# Patient Record
Sex: FEMALE | Race: WHITE | Employment: OTHER | URBAN - METROPOLITAN AREA
[De-identification: names, ages, dates, MRNs, and addresses within clinical notes are randomized per-mention and may not be internally consistent; named-entity substitution may affect disease eponyms.]

---

## 2018-07-24 ENCOUNTER — APPOINTMENT (OUTPATIENT)
Dept: GENERAL RADIOLOGY | Facility: HOSPITAL | Age: 83
End: 2018-07-24
Attending: EMERGENCY MEDICINE
Payer: MEDICARE

## 2018-07-24 ENCOUNTER — HOSPITAL ENCOUNTER (EMERGENCY)
Facility: HOSPITAL | Age: 83
Discharge: HOME | End: 2018-07-24
Attending: EMERGENCY MEDICINE
Payer: MEDICARE

## 2018-07-24 VITALS
WEIGHT: 120 LBS | DIASTOLIC BLOOD PRESSURE: 65 MMHG | RESPIRATION RATE: 18 BRPM | BODY MASS INDEX: 21.26 KG/M2 | TEMPERATURE: 99 F | HEART RATE: 83 BPM | OXYGEN SATURATION: 100 % | SYSTOLIC BLOOD PRESSURE: 141 MMHG | HEIGHT: 63 IN

## 2018-07-24 DIAGNOSIS — R06.02 SHORTNESS OF BREATH: Primary | ICD-10-CM

## 2018-07-24 PROCEDURE — 99283 EMERGENCY DEPT VISIT LOW MDM: CPT

## 2018-07-24 PROCEDURE — 71046 X-RAY EXAM CHEST 2 VIEWS: CPT | Performed by: EMERGENCY MEDICINE

## 2018-07-24 NOTE — ED PROVIDER NOTES
Patient Seen in: [facility] AND [facility] Emergency Department    History   Patient presents with:  Dyspnea PITER SOB (respiratory)    Stated Complaint:     HPI    80-year-old female with history of COPD, on home O2 of 3 L, CVA, anxiety, brought in by EMS with co 141/65  Pulse: 83  Resp: 18  Temp: 98.8 °F (37.1 °C)  Temp src: n/a  SpO2: 100 %  O2 Device: n/a    Current:/65   Pulse 83   Temp 98.8 °F (37.1 °C)   Resp 18   Ht 160 cm (5' 3\")   Wt 54.4 kg   SpO2 100%   BMI 21.26 kg/m²         Physical Exam   Cons 7/24/2018 at 14:46              EMERGENCY DEPARTMENT COURSE AND TREATMENT:  Patient's condition was stable during Emergency Department evaluation.      83yoF with shortness of breath, now resolved  - I personally reviewed and interpreted all the ED vitals possible for a visit in 2 days  As needed    We recommend that you schedule follow up care with a primary care provider within the next three months to obtain basic health screening including reassessment of your blood pressure.     Medications Prescribed:

## 2018-10-04 ENCOUNTER — HOSPITAL ENCOUNTER (EMERGENCY)
Facility: HOSPITAL | Age: 83
Discharge: HOME | End: 2018-10-04
Attending: EMERGENCY MEDICINE
Payer: MEDICARE

## 2018-10-04 VITALS
HEART RATE: 74 BPM | TEMPERATURE: 98 F | WEIGHT: 130 LBS | OXYGEN SATURATION: 98 % | SYSTOLIC BLOOD PRESSURE: 150 MMHG | BODY MASS INDEX: 23.04 KG/M2 | DIASTOLIC BLOOD PRESSURE: 74 MMHG | RESPIRATION RATE: 20 BRPM | HEIGHT: 63 IN

## 2018-10-04 DIAGNOSIS — I10 ESSENTIAL HYPERTENSION: Primary | ICD-10-CM

## 2018-10-04 PROCEDURE — 99283 EMERGENCY DEPT VISIT LOW MDM: CPT

## 2018-10-04 NOTE — ED NOTES
The patient is cleared for discharge per Emergency Department provider. Discharge instructions reviewed with patient including when and how to follow up with healthcare providers and when to seek emergency care.  [facility] ambulance will provide transportat

## 2018-10-04 NOTE — ED PROVIDER NOTES
Patient Seen in: [facility] AND [facility] Emergency Department    History   Patient presents with:  Hypertension (cardiovascular)    Stated Complaint: hypertension    HPI    Patient is an 80-year-old female who presents to the emergency department with a chief Normal range of motion. Neurological: She is alert and oriented to person, place, and time. Skin: Skin is warm and dry. No rash noted. Nursing note and vitals reviewed.           ED Course   Labs Reviewed - No data to display             MDM

## 2018-10-04 NOTE — ED INITIAL ASSESSMENT (HPI)
Via medics from home---patients care giver checked her BP today and it was high ---182/80.  Patient taking meds as prescribed with no complaints    Patient tearful, states her son  last week

## 2018-10-05 ENCOUNTER — HOSPITAL ENCOUNTER (EMERGENCY)
Facility: HOSPITAL | Age: 83
Discharge: HOME | End: 2018-10-05
Attending: EMERGENCY MEDICINE
Payer: MEDICARE

## 2018-10-05 VITALS
BODY MASS INDEX: 22.15 KG/M2 | OXYGEN SATURATION: 100 % | HEIGHT: 63 IN | TEMPERATURE: 98 F | SYSTOLIC BLOOD PRESSURE: 145 MMHG | HEART RATE: 81 BPM | WEIGHT: 125 LBS | DIASTOLIC BLOOD PRESSURE: 79 MMHG | RESPIRATION RATE: 23 BRPM

## 2018-10-05 DIAGNOSIS — I10 UNCONTROLLED HYPERTENSION: Primary | ICD-10-CM

## 2018-10-05 PROCEDURE — 93010 ELECTROCARDIOGRAM REPORT: CPT | Performed by: EMERGENCY MEDICINE

## 2018-10-05 PROCEDURE — 93005 ELECTROCARDIOGRAM TRACING: CPT

## 2018-10-05 PROCEDURE — 96374 THER/PROPH/DIAG INJ IV PUSH: CPT

## 2018-10-05 PROCEDURE — 80048 BASIC METABOLIC PNL TOTAL CA: CPT | Performed by: EMERGENCY MEDICINE

## 2018-10-05 PROCEDURE — 85025 COMPLETE CBC W/AUTO DIFF WBC: CPT | Performed by: EMERGENCY MEDICINE

## 2018-10-05 PROCEDURE — 99285 EMERGENCY DEPT VISIT HI MDM: CPT

## 2018-10-05 RX ORDER — METOPROLOL TARTRATE 5 MG/5ML
5 INJECTION INTRAVENOUS ONCE
Status: COMPLETED | OUTPATIENT
Start: 2018-10-05 | End: 2018-10-05

## 2018-10-05 RX ORDER — AMLODIPINE BESYLATE 5 MG/1
5 TABLET ORAL ONCE
Status: COMPLETED | OUTPATIENT
Start: 2018-10-05 | End: 2018-10-05

## 2018-10-05 RX ORDER — HYDRALAZINE HYDROCHLORIDE 25 MG/1
25 TABLET, FILM COATED ORAL 2 TIMES DAILY
Qty: 60 TABLET | Refills: 0 | Status: SHIPPED | OUTPATIENT
Start: 2018-10-05

## 2018-10-05 RX ORDER — ONDANSETRON 4 MG/1
4 TABLET, ORALLY DISINTEGRATING ORAL ONCE
Status: COMPLETED | OUTPATIENT
Start: 2018-10-05 | End: 2018-10-05

## 2018-10-05 NOTE — ED NOTES
Presents to ER with EMS, from home. C/o hypertension 200/103 at home. Also c/o mild L sided HA and dyspnea on exertion. Denies vision changes. Denies recent cough or cold symptoms.  States her daughter advised her to discontinue blood medication yesterday m

## 2018-10-05 NOTE — ED INITIAL ASSESSMENT (HPI)
Via EMS from home---called for hypertension 200/100. Patient seen in this ED yesterday for same complaint. Patient has no complaints    Son  last week.  Patient reports her daughter told her not to take her medications

## 2018-10-06 NOTE — ED PROVIDER NOTES
Patient Seen in: [facility] AND [facility] Emergency Department    History   Patient presents with:  Hypertension (cardiovascular)      HPI    Patient presents to the ED for elevated blood pressure. Apparently pressure was 200/100 at home.   Patient was seen in Family History elements reviewed from today, pertinent positives to the presenting problem noted.     Physical Exam     ED Triage Vitals   BP 10/05/18 1706 (!) 177/89   Pulse 10/05/18 1706 92   Resp 10/05/18 1706 20   Temp 10/05/18 1719 98.1 °F (36.7 °C) -----------         ------                     CBC W/ DIFFERENTIAL[[id number]]          Abnormal            Final result                 Please view results for these tests on the individual orders.    RAINBOW DRAW LAVENDER   RAINBOW DRAW LIG pressure reduced in the ED with medications. I had a long discussion the patient and her daughter regarding disposition.   Family adamant that the patient's pressure is not well controlled with carvedilol recently although I explained that the patient's bl

## 2018-10-06 NOTE — ED NOTES
Called [facility], reports 20 minute eta at this time, informed that service has been dispatched to this location and is en route.

## 2018-10-10 ENCOUNTER — HOSPITAL ENCOUNTER (EMERGENCY)
Facility: HOSPITAL | Age: 83
Discharge: HOME | End: 2018-10-10
Attending: EMERGENCY MEDICINE
Payer: MEDICARE

## 2018-10-10 VITALS
RESPIRATION RATE: 16 BRPM | WEIGHT: 125 LBS | HEIGHT: 63 IN | OXYGEN SATURATION: 98 % | TEMPERATURE: 98 F | BODY MASS INDEX: 22.15 KG/M2 | DIASTOLIC BLOOD PRESSURE: 74 MMHG | SYSTOLIC BLOOD PRESSURE: 155 MMHG | HEART RATE: 86 BPM

## 2018-10-10 DIAGNOSIS — I10 HYPERTENSION, UNSPECIFIED TYPE: Primary | ICD-10-CM

## 2018-10-10 PROCEDURE — 99284 EMERGENCY DEPT VISIT MOD MDM: CPT

## 2018-10-10 RX ORDER — HYDRALAZINE HYDROCHLORIDE 25 MG/1
25 TABLET, FILM COATED ORAL ONCE
Status: COMPLETED | OUTPATIENT
Start: 2018-10-10 | End: 2018-10-10

## 2018-10-10 NOTE — ED NOTES
[facility] at bedside for transport home. Discharged home with plan to follow up with PCP as indicated. Alert and interactive. Hemodynamically stable. Agrees with proposed care plan, all questions answered.

## 2018-10-10 NOTE — ED INITIAL ASSESSMENT (HPI)
Presents via EMS from home, states BP reading at home was systolic 636'G.  New BP med started 10/4/18

## 2018-10-10 NOTE — ED NOTES
Care assumed from EMS. Pt presents with c/o high BP, states that she was switched to a new BP medication on 10/4/18 and \"it isn't working\". Reporting her home BP measurement was 037'B systolic, last took her BP meds at noon. Is unsure of what she takes.

## 2018-10-10 NOTE — ED PROVIDER NOTES
Patient Seen in: [facility] AND [facility] Emergency Department    History   Patient presents with:  Hypertension (cardiovascular)    Stated Complaint:     HPI    26-year-old female with past medical history significant for anxiety, COPD, high blood pressure, CV clear with no exudates  Eyes: Conjunctivae and EOM are normal. PERRLA  Neck: Normal range of motion. Neck supple. No tracheal deviation present. CV: s1s2+, RRR, no m/r/g, normal distal pulses  Pulmonary/Chest: CTA b/l with no rales, wheezes.   No chest wa

## 2018-10-18 ENCOUNTER — HOSPITAL ENCOUNTER (EMERGENCY)
Facility: HOSPITAL | Age: 83
Discharge: HOME | End: 2018-10-18
Attending: EMERGENCY MEDICINE
Payer: MEDICARE

## 2018-10-18 VITALS
HEIGHT: 63 IN | SYSTOLIC BLOOD PRESSURE: 169 MMHG | HEART RATE: 67 BPM | RESPIRATION RATE: 18 BRPM | WEIGHT: 120 LBS | TEMPERATURE: 99 F | OXYGEN SATURATION: 100 % | BODY MASS INDEX: 21.26 KG/M2 | DIASTOLIC BLOOD PRESSURE: 64 MMHG

## 2018-10-18 DIAGNOSIS — K59.00 CONSTIPATION, UNSPECIFIED CONSTIPATION TYPE: Primary | ICD-10-CM

## 2018-10-18 PROCEDURE — 99285 EMERGENCY DEPT VISIT HI MDM: CPT

## 2018-10-18 RX ORDER — POLYETHYLENE GLYCOL 3350 17 G/17G
17 POWDER, FOR SOLUTION ORAL DAILY PRN
Qty: 12 EACH | Refills: 0 | Status: SHIPPED | OUTPATIENT
Start: 2018-10-18 | End: 2018-11-10 | Stop reason: ALTCHOICE

## 2018-10-18 RX ORDER — SODIUM PHOSPHATE, DIBASIC AND SODIUM PHOSPHATE, MONOBASIC 7; 19 G/133ML; G/133ML
1 ENEMA RECTAL ONCE
Status: COMPLETED | OUTPATIENT
Start: 2018-10-18 | End: 2018-10-18

## 2018-10-18 RX ORDER — SODIUM CHLORIDE 9 MG/ML
125 INJECTION, SOLUTION INTRAVENOUS CONTINUOUS
Status: DISCONTINUED | OUTPATIENT
Start: 2018-10-18 | End: 2018-10-18

## 2018-10-18 NOTE — CM/SW NOTE
Met with patient for transportation home. Pt lives in house with 2 steps to enter into house. Pt normally uses [facility] however, did not bring it. [facility] will send medicar and cost of $45 informed to pt and consented to pay upon arrival to home.   ETA 1/12hr

## 2018-10-18 NOTE — ED INITIAL ASSESSMENT (HPI)
Pt brought in by EMS for complaints of constipation. Per pt last normal BM two days ago.  EMS reported Pt had BM on their arrival.

## 2018-10-18 NOTE — ED PROVIDER NOTES
Patient Seen in: [facility] AND [facility] Emergency Department    History   Patient presents with:  Constipation (gastrointestinal)    Stated Complaint: constipation    HPI    49-year-old female patient presents complaining of chronic constipation.   States that give additional Fleet enema. MDM   Patient had a bowel movement after Fleet enema. Feeling much better. Taking p.o. Will discharge patient with follow-up. MiraLAX provided.             Disposition and Plan     Clinical Impression:  Constipation, unsp

## 2018-11-10 ENCOUNTER — HOSPITAL ENCOUNTER (INPATIENT)
Facility: HOSPITAL | Age: 83
LOS: 4 days | Discharge: NURSING, REHAB OR LONG-TERM CARE | DRG: 291 | End: 2018-11-14
Attending: EMERGENCY MEDICINE | Admitting: HOSPITALIST
Payer: MEDICARE

## 2018-11-10 ENCOUNTER — APPOINTMENT (OUTPATIENT)
Dept: GENERAL RADIOLOGY | Facility: HOSPITAL | Age: 83
DRG: 291 | End: 2018-11-10
Attending: EMERGENCY MEDICINE
Payer: MEDICARE

## 2018-11-10 DIAGNOSIS — R07.9 ACUTE CHEST PAIN: Primary | ICD-10-CM

## 2018-11-10 DIAGNOSIS — E87.6 HYPOKALEMIA: ICD-10-CM

## 2018-11-10 DIAGNOSIS — I50.9 ACUTE CONGESTIVE HEART FAILURE, UNSPECIFIED HEART FAILURE TYPE (HCC): ICD-10-CM

## 2018-11-10 PROCEDURE — 99223 1ST HOSP IP/OBS HIGH 75: CPT | Performed by: HOSPITALIST

## 2018-11-10 PROCEDURE — 71045 X-RAY EXAM CHEST 1 VIEW: CPT | Performed by: EMERGENCY MEDICINE

## 2018-11-10 PROCEDURE — 90792 PSYCH DIAG EVAL W/MED SRVCS: CPT | Performed by: OTHER

## 2018-11-10 PROCEDURE — 3E0F7GC INTRODUCTION OF OTHER THERAPEUTIC SUBSTANCE INTO RESPIRATORY TRACT, VIA NATURAL OR ARTIFICIAL OPENING: ICD-10-PCS | Performed by: HOSPITALIST

## 2018-11-10 RX ORDER — FLUTICASONE PROPIONATE AND SALMETEROL 250; 50 UG/1; UG/1
1 POWDER RESPIRATORY (INHALATION) EVERY 12 HOURS SCHEDULED
COMMUNITY

## 2018-11-10 RX ORDER — CARVEDILOL 6.25 MG/1
6.25 TABLET ORAL 2 TIMES DAILY WITH MEALS
Status: DISCONTINUED | OUTPATIENT
Start: 2018-11-10 | End: 2018-11-14

## 2018-11-10 RX ORDER — OMEPRAZOLE 20 MG/1
40 CAPSULE, DELAYED RELEASE ORAL
COMMUNITY

## 2018-11-10 RX ORDER — SIMVASTATIN 10 MG
10 TABLET ORAL NIGHTLY
COMMUNITY

## 2018-11-10 RX ORDER — CHOLECALCIFEROL (VITAMIN D3) 125 MCG
2500 CAPSULE ORAL DAILY
Status: DISCONTINUED | OUTPATIENT
Start: 2018-11-10 | End: 2018-11-14

## 2018-11-10 RX ORDER — BUPROPION HYDROCHLORIDE 100 MG/1
100 TABLET, EXTENDED RELEASE ORAL 2 TIMES DAILY
Status: ON HOLD | COMMUNITY
End: 2018-11-12

## 2018-11-10 RX ORDER — BUPROPION HYDROCHLORIDE 100 MG/1
100 TABLET, EXTENDED RELEASE ORAL 2 TIMES DAILY
Status: DISCONTINUED | OUTPATIENT
Start: 2018-11-10 | End: 2018-11-10

## 2018-11-10 RX ORDER — MONTELUKAST SODIUM 10 MG/1
10 TABLET ORAL NIGHTLY
COMMUNITY

## 2018-11-10 RX ORDER — LORAZEPAM 1 MG/1
1 TABLET ORAL 2 TIMES DAILY
Status: DISCONTINUED | OUTPATIENT
Start: 2018-11-10 | End: 2018-11-12

## 2018-11-10 RX ORDER — POTASSIUM CHLORIDE 20 MEQ/1
40 TABLET, EXTENDED RELEASE ORAL EVERY 4 HOURS
Status: COMPLETED | OUTPATIENT
Start: 2018-11-10 | End: 2018-11-10

## 2018-11-10 RX ORDER — PAROXETINE HYDROCHLORIDE 40 MG/1
40 TABLET, FILM COATED ORAL NIGHTLY
Status: ON HOLD | COMMUNITY
End: 2018-11-12

## 2018-11-10 RX ORDER — DOCUSATE SODIUM 100 MG/1
100 CAPSULE, LIQUID FILLED ORAL 2 TIMES DAILY
Status: DISCONTINUED | OUTPATIENT
Start: 2018-11-10 | End: 2018-11-14

## 2018-11-10 RX ORDER — HYDROCODONE BITARTRATE AND ACETAMINOPHEN 5; 325 MG/1; MG/1
1 TABLET ORAL EVERY 4 HOURS PRN
Status: DISCONTINUED | OUTPATIENT
Start: 2018-11-10 | End: 2018-11-14

## 2018-11-10 RX ORDER — POLYETHYLENE GLYCOL 3350 17 G/17G
17 POWDER, FOR SOLUTION ORAL DAILY PRN
Status: DISCONTINUED | OUTPATIENT
Start: 2018-11-10 | End: 2018-11-14

## 2018-11-10 RX ORDER — HYDRALAZINE HYDROCHLORIDE 25 MG/1
25 TABLET, FILM COATED ORAL 2 TIMES DAILY
Status: DISCONTINUED | OUTPATIENT
Start: 2018-11-10 | End: 2018-11-14

## 2018-11-10 RX ORDER — BISACODYL 10 MG
10 SUPPOSITORY, RECTAL RECTAL
Status: DISCONTINUED | OUTPATIENT
Start: 2018-11-10 | End: 2018-11-14

## 2018-11-10 RX ORDER — PANTOPRAZOLE SODIUM 40 MG/1
40 TABLET, DELAYED RELEASE ORAL
Status: DISCONTINUED | OUTPATIENT
Start: 2018-11-10 | End: 2018-11-14

## 2018-11-10 RX ORDER — POTASSIUM CHLORIDE 20 MEQ/1
40 TABLET, EXTENDED RELEASE ORAL ONCE
Status: COMPLETED | OUTPATIENT
Start: 2018-11-10 | End: 2018-11-10

## 2018-11-10 RX ORDER — HYDROCODONE BITARTRATE AND ACETAMINOPHEN 5; 325 MG/1; MG/1
2 TABLET ORAL EVERY 4 HOURS PRN
Status: DISCONTINUED | OUTPATIENT
Start: 2018-11-10 | End: 2018-11-14

## 2018-11-10 RX ORDER — SODIUM CHLORIDE 0.9 % (FLUSH) 0.9 %
3 SYRINGE (ML) INJECTION AS NEEDED
Status: DISCONTINUED | OUTPATIENT
Start: 2018-11-10 | End: 2018-11-14

## 2018-11-10 RX ORDER — OLANZAPINE 2.5 MG/1
2.5 TABLET ORAL NIGHTLY
Status: DISCONTINUED | OUTPATIENT
Start: 2018-11-10 | End: 2018-11-14

## 2018-11-10 RX ORDER — IPRATROPIUM BROMIDE AND ALBUTEROL SULFATE 2.5; .5 MG/3ML; MG/3ML
3 SOLUTION RESPIRATORY (INHALATION) EVERY 6 HOURS PRN
Status: DISCONTINUED | OUTPATIENT
Start: 2018-11-10 | End: 2018-11-14

## 2018-11-10 RX ORDER — ONDANSETRON 2 MG/ML
4 INJECTION INTRAMUSCULAR; INTRAVENOUS EVERY 6 HOURS PRN
Status: DISCONTINUED | OUTPATIENT
Start: 2018-11-10 | End: 2018-11-14

## 2018-11-10 RX ORDER — MONTELUKAST SODIUM 10 MG/1
10 TABLET ORAL NIGHTLY
Status: DISCONTINUED | OUTPATIENT
Start: 2018-11-10 | End: 2018-11-14

## 2018-11-10 RX ORDER — ASPIRIN 81 MG/1
81 TABLET ORAL DAILY
Status: DISCONTINUED | OUTPATIENT
Start: 2018-11-11 | End: 2018-11-14

## 2018-11-10 RX ORDER — CARVEDILOL 6.25 MG/1
6.25 TABLET ORAL 2 TIMES DAILY WITH MEALS
COMMUNITY

## 2018-11-10 RX ORDER — FUROSEMIDE 10 MG/ML
20 INJECTION INTRAMUSCULAR; INTRAVENOUS ONCE
Status: COMPLETED | OUTPATIENT
Start: 2018-11-10 | End: 2018-11-10

## 2018-11-10 RX ORDER — PAROXETINE HYDROCHLORIDE 20 MG/1
40 TABLET, FILM COATED ORAL NIGHTLY
Status: DISCONTINUED | OUTPATIENT
Start: 2018-11-10 | End: 2018-11-12

## 2018-11-10 RX ORDER — RISPERIDONE 0.25 MG/1
0.25 TABLET, FILM COATED ORAL NIGHTLY
Status: DISCONTINUED | OUTPATIENT
Start: 2018-11-10 | End: 2018-11-10

## 2018-11-10 RX ORDER — METOCLOPRAMIDE HYDROCHLORIDE 5 MG/ML
10 INJECTION INTRAMUSCULAR; INTRAVENOUS EVERY 8 HOURS PRN
Status: DISCONTINUED | OUTPATIENT
Start: 2018-11-10 | End: 2018-11-14

## 2018-11-10 RX ORDER — ATORVASTATIN CALCIUM 20 MG/1
20 TABLET, FILM COATED ORAL NIGHTLY
Status: DISCONTINUED | OUTPATIENT
Start: 2018-11-10 | End: 2018-11-14

## 2018-11-10 RX ORDER — LORAZEPAM 1 MG/1
1 TABLET ORAL 2 TIMES DAILY
Status: ON HOLD | COMMUNITY
End: 2018-11-14

## 2018-11-10 RX ORDER — FUROSEMIDE 10 MG/ML
40 INJECTION INTRAMUSCULAR; INTRAVENOUS ONCE
Status: COMPLETED | OUTPATIENT
Start: 2018-11-10 | End: 2018-11-10

## 2018-11-10 RX ORDER — LORAZEPAM 2 MG/ML
0.5 INJECTION INTRAMUSCULAR ONCE
Status: COMPLETED | OUTPATIENT
Start: 2018-11-10 | End: 2018-11-10

## 2018-11-10 RX ORDER — ASPIRIN 81 MG/1
324 TABLET, CHEWABLE ORAL ONCE
Status: COMPLETED | OUTPATIENT
Start: 2018-11-10 | End: 2018-11-10

## 2018-11-10 RX ORDER — THIAMINE HCL 100 MG
1 TABLET ORAL DAILY
COMMUNITY

## 2018-11-10 RX ORDER — ACETAMINOPHEN 500 MG
500 TABLET ORAL EVERY 4 HOURS PRN
COMMUNITY

## 2018-11-10 RX ORDER — ACETAMINOPHEN 325 MG/1
650 TABLET ORAL EVERY 4 HOURS PRN
Status: DISCONTINUED | OUTPATIENT
Start: 2018-11-10 | End: 2018-11-14

## 2018-11-10 RX ORDER — LISINOPRIL 10 MG/1
10 TABLET ORAL DAILY
Status: DISCONTINUED | OUTPATIENT
Start: 2018-11-10 | End: 2018-11-14

## 2018-11-10 RX ORDER — RISPERIDONE 0.25 MG/1
0.25 TABLET, FILM COATED ORAL NIGHTLY
COMMUNITY

## 2018-11-10 RX ORDER — IPRATROPIUM BROMIDE AND ALBUTEROL SULFATE 2.5; .5 MG/3ML; MG/3ML
3 SOLUTION RESPIRATORY (INHALATION) 2 TIMES DAILY
Status: DISCONTINUED | OUTPATIENT
Start: 2018-11-10 | End: 2018-11-14

## 2018-11-10 RX ORDER — BIOTIN 1 MG
1 TABLET ORAL DAILY
COMMUNITY

## 2018-11-10 RX ORDER — ENOXAPARIN SODIUM 100 MG/ML
40 INJECTION SUBCUTANEOUS DAILY
Status: DISCONTINUED | OUTPATIENT
Start: 2018-11-10 | End: 2018-11-14

## 2018-11-10 NOTE — ED NOTES
Patient endorsed to me from Dr. Sonia Bourne. Patient developed chest pain this morning with dyspnea nausea and diaphoresis. Patient's EKG reveals no acute ischemic changes. CBC unremarkable. BMP with hypokalemia, she is given oral potassium replacement.   Diaz Joiner

## 2018-11-10 NOTE — ED NOTES
Pt stated that she couldn't move her bowels. Pt said that she didn't go for 2 days, although her pattern is every 2 days. Pt stated starting to take the milk of magnesia. Dr. Gasper Loja notified.

## 2018-11-10 NOTE — H&P
[facility] Patient Status:  Inpatient    3/16/1935 MRN [id number]   Location [facility] 3W/SW Attending Facundo Sullivan MD   Hosp Day # 0 PCP No primary care provider on file.      Date:  1 UNKNOWN    Medications Prior to Admission:  acetaminophen 500 MG Oral Tab Take 500 mg by mouth every 4 (four) hours as needed for Pain. aspirin 81 MG Oral Tab Take 81 mg by mouth daily.    BuPROPion HCl ER, SR, 100 MG Oral Tablet 12 Hr Take 100 mg by mout Sclera was anicteric. Mmm, poor oral hygiene NECK:  Supple. There was no JVD. CHEST:  Symmetrical movement on inspiration  HEART:  S1 and S2 heard. RRR   LUNGS:  Decreased bs at bases  ABDOMEN: Soft and non-tender. Bowel sounds were present.   MUSCULO unspecified heart failure type ([facility] [address]  -new diagnosis  -plan obtain old records  -cont asa, coreg, plan diuresis with lasix  -not on ace at home will start today  -monitor kidney function    HTN    HL    Major Depression/Dementia  -has a hx.  -plan to [facility]

## 2018-11-10 NOTE — ED NOTES
PT  STATED THAT HER SON  2 WEEKS AGO FROM HEART ATTACK AND THAT SHE \"CANNOT GO OVER IT.\" PT ADMITTED HAVING ANXIETY.

## 2018-11-10 NOTE — CM/SW NOTE
11/10/18 1000   CM/SW Screening   Patient's Mental Status Alert;Oriented   Patient's [address]   Patient lives with ( sons \"partners\")   Patient Status Prior to Admission   Independent with ADLs and Mobility No   Pt. requires assi

## 2018-11-10 NOTE — ED PROVIDER NOTES
Patient Seen in: [facility] AND [facility] Emergency Department    History   Patient presents with:  Chest Pain Angina (cardiovascular)    Stated Complaint:     HPI    26-year-old female with past medical history significant for anxiety, COPD, CVA, pacemaker, pr supple. No tracheal deviation present. CV: s1s2+, RRR, no m/r/g, normal distal pulses  Pulmonary/Chest: CTA b/l with no rales, wheezes. No chest wall tenderness  Abdominal: Nontender. Nondistended. Soft. Bowel sounds are normal.   Back:   :    Musculo patient. Disposition and Plan     Clinical Impression:  Acute chest pain  (primary encounter diagnosis)    Disposition:  There is no disposition on file for this visit. There is no disposition time on file for this visit.     Follow-up:  Lola Mayberry

## 2018-11-11 ENCOUNTER — APPOINTMENT (OUTPATIENT)
Dept: CV DIAGNOSTICS | Facility: HOSPITAL | Age: 83
DRG: 291 | End: 2018-11-11
Attending: HOSPITALIST
Payer: MEDICARE

## 2018-11-11 ENCOUNTER — APPOINTMENT (OUTPATIENT)
Dept: GENERAL RADIOLOGY | Facility: HOSPITAL | Age: 83
DRG: 291 | End: 2018-11-11
Attending: HOSPITALIST
Payer: MEDICARE

## 2018-11-11 PROCEDURE — 93306 TTE W/DOPPLER COMPLETE: CPT | Performed by: HOSPITALIST

## 2018-11-11 PROCEDURE — 99233 SBSQ HOSP IP/OBS HIGH 50: CPT | Performed by: HOSPITALIST

## 2018-11-11 PROCEDURE — 71045 X-RAY EXAM CHEST 1 VIEW: CPT | Performed by: HOSPITALIST

## 2018-11-11 NOTE — CM/SW NOTE
Dr. Karen Hernandes declined transfer- Dr. Ryan Jarvis paged, on call for Dr. Rylie Hoang. He states they would possibly transfer her tomorrow. He asks to have Dr. Venice Ortega paged after 0800 tomorrow to discuss. Family updated.

## 2018-11-11 NOTE — CONSULTS
[facility]    Report of Consultation    Verona Parsons Patient Status:  Inpatient    3/16/1935 MRN [id number]   [facility] 3W/SW Attending Facundo Sullivan MD   Hosp Day # 0 PCP No primary care provider on file.      Linsey Webster never tried to kill herself. The patient has been exhibiting lack of energy, lack of motivation, lack of motivation with poor concentration and memory problem.   Patient apparently has been exhibiting some somatic manifestation and has been in the emerge Nightly   psyllium (METAMUCIL SMOOTH TEXTURE) 28 % packet 1 packet 1 packet Oral Daily   risperiDONE (RISPERDAL) tab 0.25 mg 0.25 mg Oral Nightly   atorvastatin (LIPITOR) tab 20 mg 20 mg Oral Nightly   Normal Saline Flush 0.9 % injection 3 mL 3 mL Intraven hours.   LORazepam 1 MG Oral Tab Take 1 mg by mouth 2 (two) times daily. Melatonin 3 MG Oral Cap Take 6 mg by mouth nightly. Montelukast Sodium 10 MG Oral Tab Take 10 mg by mouth nightly. PARoxetine HCl 40 MG Oral Tab Take 40 mg by mouth nightly.    r Vital Signs:     Blood pressure 122/53, pulse 83, temperature 99.4 °F (37.4 °C), temperature source Oral, resp. rate 18, height 63\", weight 120 lb, SpO2 94 %. Mental Status Exam:     The patient is alert and oriented x3.   Stated age female in help with sleep. 6.  Continue Paxil for now otherwise Paxil is not recommended for the elderly population with anticholinergic problems such chronic constipation which patient have. Consider changing slowly if possible during his hospitalization.   7.  Co

## 2018-11-11 NOTE — CM/SW NOTE
Spoke with daughter about out of pocket expense of transfer and they agree. I offered to quite them the exact cost -- stated, \"it's ok we want her at [facility] in [place]. \"     Will attempt transfer- Dr. Romana Blanks paged for possible transfer.

## 2018-11-11 NOTE — CONSULTS
[facility]  Report of Consultation    Glorious Mink Patient Status:  Inpatient    3/16/1935 MRN [id number]   Location [facility] 3W/SW Attending Garrett Loaiza MD   Hosp Day # 1 PCP No primary care provider on file.      Reason for [facility] Depression    • Essential hypertension    • High blood pressure    • High cholesterol    • Stroke [facility])      Past Surgical History:   Procedure Laterality Date   • APPENDECTOMY       History reviewed. No pertinent family history.    reports that she has [employer] PRN  •  bisacodyl (DULCOLAX) rectal suppository 10 mg, 10 mg, Rectal, Daily PRN  •  ondansetron HCl (ZOFRAN) injection 4 mg, 4 mg, Intravenous, Q6H PRN  •  Metoclopramide HCl (REGLAN) injection 10 mg, 10 mg, Intravenous, Q8H PRN  •  ipratropium-albuterol ( 3. 2*  4.6  4.4   CL  99   --   98   CO2  31   --   31   BUN  12   --   17   CREATSERUM  0.81   --   0.84   CA  9.3   --   9.2   MG  2.0   --    --          Recent Labs      11/10/18   0536  11/10/18   0731   TROP  0.02  0.01     Lab Results   Component Lenka

## 2018-11-11 NOTE — PROGRESS NOTES
D/w daughter all her docs are at [facility]  Daughter is requesting tx.  To Alexej brothers  D/w ED case manager  Saúl Nelson MD

## 2018-11-11 NOTE — PROGRESS NOTES
[facility]    Progress Note    Chidi Londono Patient Status:  Inpatient    3/16/1935 MRN [id number]   Location [facility] 3W/SW Attending Helen Nixon MD   Hosp Day # 1 PCP No primary care provider on file.        Subjective: Results:     Lab Results   Component Value Date    WBC 14.2 (H) 11/11/2018    HGB 9.4 (L) 11/11/2018    HCT 28.5 (L) 11/11/2018     (H) 11/11/2018    CREATSERUM 0.84 11/11/2018    BUN 17 11/11/2018     (L) 11/11/2018    K 4.4 11/11/2 home will start today  -monitor kidney function     Leukocytosis  -wbc elevated today  -will check UA and culture  -check cxr  - IS     HTN     HL     Major Depression/Dementia  -has a hx.  -plan to obtain outside records  -psych eval     COPD  -cont inhal

## 2018-11-12 PROCEDURE — 99233 SBSQ HOSP IP/OBS HIGH 50: CPT | Performed by: OTHER

## 2018-11-12 PROCEDURE — 99233 SBSQ HOSP IP/OBS HIGH 50: CPT | Performed by: HOSPITALIST

## 2018-11-12 RX ORDER — VENLAFAXINE HYDROCHLORIDE 37.5 MG/1
37.5 CAPSULE, EXTENDED RELEASE ORAL DAILY
Status: DISCONTINUED | OUTPATIENT
Start: 2018-11-12 | End: 2018-11-14

## 2018-11-12 RX ORDER — VENLAFAXINE HYDROCHLORIDE 37.5 MG/1
37.5 CAPSULE, EXTENDED RELEASE ORAL DAILY
Refills: 0 | Status: SHIPPED | COMMUNITY
Start: 2018-11-12

## 2018-11-12 RX ORDER — ONDANSETRON 2 MG/ML
4 INJECTION INTRAMUSCULAR; INTRAVENOUS EVERY 6 HOURS PRN
Refills: 0 | Status: SHIPPED | COMMUNITY
Start: 2018-11-12

## 2018-11-12 RX ORDER — OLANZAPINE 2.5 MG/1
2.5 TABLET ORAL NIGHTLY
Refills: 0 | Status: SHIPPED | COMMUNITY
Start: 2018-11-12

## 2018-11-12 RX ORDER — LISINOPRIL 10 MG/1
10 TABLET ORAL DAILY
Refills: 0 | Status: SHIPPED | COMMUNITY
Start: 2018-11-13

## 2018-11-12 RX ORDER — POLYETHYLENE GLYCOL 3350 17 G/17G
17 POWDER, FOR SOLUTION ORAL DAILY PRN
Refills: 0 | Status: SHIPPED | COMMUNITY
Start: 2018-11-12

## 2018-11-12 RX ORDER — PAROXETINE 30 MG/1
30 TABLET, FILM COATED ORAL NIGHTLY
Refills: 0 | Status: SHIPPED | COMMUNITY
Start: 2018-11-12

## 2018-11-12 RX ORDER — ENOXAPARIN SODIUM 100 MG/ML
40 INJECTION SUBCUTANEOUS DAILY
Refills: 0 | Status: SHIPPED | COMMUNITY
Start: 2018-11-13

## 2018-11-12 RX ORDER — LORAZEPAM 0.5 MG/1
0.5 TABLET ORAL EVERY 6 HOURS PRN
Status: DISCONTINUED | OUTPATIENT
Start: 2018-11-12 | End: 2018-11-14

## 2018-11-12 RX ORDER — HYDROCODONE BITARTRATE AND ACETAMINOPHEN 5; 325 MG/1; MG/1
1 TABLET ORAL EVERY 4 HOURS PRN
Refills: 0 | Status: SHIPPED | COMMUNITY
Start: 2018-11-12 | End: 2018-11-13

## 2018-11-12 NOTE — PROGRESS NOTES
[facility]    Progress Note    Alisha Counts Patient Status:  Inpatient    3/16/1935 MRN [id number]   Location [facility] 3W/SW Attending Michelle Thornton MD   Hosp Day # 2 PCP No primary care provider on file.        Subjective: ipratropium-albuterol  3 mL Nebulization BID   • lisinopril  10 mg Oral Daily   • OLANZapine  2.5 mg Oral Nightly           Results:     Lab Results   Component Value Date    WBC 12.4 (H) 11/12/2018    HGB 9.1 (L) 11/12/2018    HCT 27.8 (L) 11/12/2018    P minutes spent, >50% spent counseling re: treatment plan and work up.       Joyce Clark MD

## 2018-11-12 NOTE — CM/SW NOTE
Followed up regarding transfer to [facility]. Spoke w/ Dr. Akila Desouza who is requesting our hospitalist call him directly to discuss the case. Notified Dr. Jarocho Torres of the above.   Twila Olson RN, [place] [phone]    1300 - Received phone call from Dr. Jarocho Torres

## 2018-11-12 NOTE — CM/SW NOTE
BREN received an MDO regarding PHQ4. BREN met with pt at bedside with pt's daughter Corey Mortensen [phone] on the speaker phone. Pt stated that she has recently lost her son and is in the grieving process. SW provided [facility] resources.  Pt's daughter stated that the p

## 2018-11-12 NOTE — PROGRESS NOTES
Attending addendum:  I have seen and examined patient, discussed with NP. Agree with assessment and plan as outlined below.         [facility]    Assessment and Plan:     Assessment:  Chest pain-troponins (-), EKGs unrevealing d/t ventricul 28.5*   MCV  87.0   MCH  28.8   MCHC  33.1   RDW  12.7   WBC  14.2*   PLT  416*     Recent Labs   Lab  11/10/18   0535   INR  1.1       Xr Chest Ap Portable  (cpt=71045)    Result Date: 11/11/2018  CONCLUSION:   No significant change.  Pulmonary vascular co

## 2018-11-12 NOTE — DIETARY NOTE
ADULT NUTRITION INITIAL ASSESSMENT    Pt is at high nutrition risk. Pt meets malnutrition criteria.       CRITERIA FOR MALNUTRITION DIAGNOSIS:  Criteria for severe malnutrition diagnosis: chronic illness related to wt loss greater than 5% in 1 month and en hrs:   Weight   11/12/18 0427 46.3 kg (102 lb 1.6 oz)   11/11/18 0547 46.3 kg (102 lb 1.6 oz)   11/10/18 0527 54.4 kg (120 lb)     Wt Readings from Last 10 Encounters:  11/12/18 : 46.3 kg (102 lb 1.6 oz)  10/18/18 : 54.4 kg (120 lb)  10/10/18 : 56.7 kg (12 region, thigh region and calf region per visual exam.  - Fluid Accumulation: none per RN documentation and none per visual exam  - Skin Integrity: reddened, at risk and RN documentation reviewed.   - Axel score 15    NUTRITION PRESCRIPTION:  Diet: Cardiac

## 2018-11-13 PROCEDURE — 99233 SBSQ HOSP IP/OBS HIGH 50: CPT | Performed by: OTHER

## 2018-11-13 PROCEDURE — 99233 SBSQ HOSP IP/OBS HIGH 50: CPT | Performed by: HOSPITALIST

## 2018-11-13 RX ORDER — POTASSIUM CHLORIDE 20 MEQ/1
40 TABLET, EXTENDED RELEASE ORAL ONCE
Status: COMPLETED | OUTPATIENT
Start: 2018-11-13 | End: 2018-11-13

## 2018-11-13 RX ORDER — LORAZEPAM 0.5 MG/1
0.5 TABLET ORAL EVERY 6 HOURS PRN
Qty: 10 TABLET | Refills: 0 | Status: SHIPPED | OUTPATIENT
Start: 2018-11-13 | End: 2018-11-14

## 2018-11-13 RX ORDER — HYDROCODONE BITARTRATE AND ACETAMINOPHEN 5; 325 MG/1; MG/1
1 TABLET ORAL EVERY 4 HOURS PRN
Qty: 10 TABLET | Refills: 0 | Status: SHIPPED | OUTPATIENT
Start: 2018-11-13 | End: 2018-11-14

## 2018-11-13 RX ORDER — 0.9 % SODIUM CHLORIDE 0.9 %
VIAL (ML) INJECTION
Status: DISPENSED
Start: 2018-11-13 | End: 2018-11-14

## 2018-11-13 RX ORDER — ONDANSETRON 4 MG/1
4 TABLET, ORALLY DISINTEGRATING ORAL EVERY 6 HOURS PRN
Status: DISCONTINUED | OUTPATIENT
Start: 2018-11-13 | End: 2018-11-14

## 2018-11-13 NOTE — PROGRESS NOTES
[facility]    Cardiology Progress Note    Dora Sanders Patient Status:  Inpatient    3/16/1935 MRN [id number]   Location [facility] 3W/SW Attending Marina Corey MD   Hosp Day # 3 PCP No primary care provider on file. pericardial rub, S3, thrill, heave or extra cardiac sounds. Lungs: Clear without wheezes, rales, rhonchi or dullness. Normal excursions and effort. Abdomen: Soft, non-tender. No organosplenomegally, mass or rebound, BS-present.   Extremities: Without clu B12) tab 2,500 mcg 2,500 mcg Oral Daily   Fluticasone Furoate-Vilanterol (BREO ELLIPTA) 100-25 MCG/INH inhaler 1 puff 1 puff Inhalation Daily   hydrALAzine HCl (APRESOLINE) tab 25 mg 25 mg Oral BID   Montelukast Sodium (SINGULAIR) tab 10 mg 10 mg Oral Nigh

## 2018-11-13 NOTE — PLAN OF CARE
Problem: Patient Centered Care  Goal: Patient preferences are identified and integrated in the patient's plan of care  Interventions:  - What would you like us to know as we care for you?  My son recently passed away  - Provide timely, complete, and accurat control medications as ordered  - Initiate emergency measures for life threatening arrhythmias  - Monitor electrolytes and administer replacement therapy as ordered  Outcome: Progressing

## 2018-11-13 NOTE — PROGRESS NOTES
Patient is a 80year old   female with history of COPD, hypertension, stroke and history of depression who presented to the hospital with chest pain and severe depression.     Consult Duration     The patient seen for over 35-minute, kallie deal with her depression and grieving. Judgment insight are questionable. 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   Mild cognitive impairment

## 2018-11-13 NOTE — CM/SW NOTE
BREN informed by [facility] liaison [facility] [phone] that they are able to accept the patient when she is medically stable for discharge. BREN left a message with pt's daughter Blayne Manzo [phone] re above stated information.  Social wor

## 2018-11-13 NOTE — PROGRESS NOTES
[facility]    Progress Note    Surya Cagle Patient Status:  Inpatient    3/16/1935 MRN [id number]   Location [facility] 3W/SW Attending Funmi Box MD   Hosp Day # 3 PCP No primary care provider on file.        Subjective: • OLANZapine  2.5 mg Oral Nightly           Results:     Lab Results   Component Value Date    WBC 12.4 (H) 11/12/2018    HGB 9.1 (L) 11/12/2018    HCT 27.8 (L) 11/12/2018     11/12/2018    CREATSERUM 0.85 11/12/2018    BUN 18 11/12/2018     her work-up and specialist are located there. Her PCP will not accept the patient for social reasons. Greater than 35 minutes spent, >50% spent counseling re: treatment plan and work up.       Daryle Oppenheim, MD

## 2018-11-13 NOTE — SLP NOTE
ADULT SWALLOWING EVALUATION    ASSESSMENT    ASSESSMENT/OVERALL IMPRESSION:  Pt seen for a bedside swallowing evaluation secondary to pt coughing on liquids. Pt was admitted on 11-10-18, with diagnosis of acute chest pain.   The RN observed coughing on liq aspiration are observed or if CXR declines. Educated pt on swallowing precautions. Collaborated swallow plan of care with RN. RN to obtain any new orders. Per KASSIE NOM[facility] functional communication measures, pt's swallow level is 4/7.          RECOMMENDATIO Scoliosis. 6. Osteoarthritis. 7. Pacemaker. CXR 11/11/18:  CONCLUSION:   No significant change. Pulmonary vascular congestion with pleural effusions and basilar predominant airspace disease. COPD.     SUBJECTIVE   The pt is alert and cooperative for s liquids/solids, No straws, Upright 90 degrees, Upright 90 degrees 30 mins after meal with mild assistance 100 % of the time across 2 sessions. In Progress     FOLLOW UP  Treatment Plan/Recommendations: Dysphagia therapy; Aspiration precautions  Number of V

## 2018-11-13 NOTE — OCCUPATIONAL THERAPY NOTE
OCCUPATIONAL THERAPY EVALUATION - INPATIENT     Room Number: 328/328-A  Evaluation Date: 11/13/2018  Type of Evaluation: Initial       Physician Order: IP Consult to Occupational Therapy  Reason for Therapy: ADL/IADL Dysfunction and Discharge Planning    O DISCHARGE RECOMMENDATIONS: MEI           PLAN  OT Treatment Plan: Balance activities; ADL training;Functional transfer training;Patient/Family education;Patient/Family training; Compensatory technique education       OCCUPATIONAL THERAPY MEDICAL/SOCIAL H ASSESSMENT  Upper extremity strength is within functional limits     COORDINATION  Gross Motor: WFL   Fine Motor: WFL     ACTIVITIES OF DAILY LIVING ASSESSMENT  AM-PAC ‘6-Clicks’ Inpatient Daily Activity Short Form  How much help from another person does t

## 2018-11-13 NOTE — PHYSICAL THERAPY NOTE
PHYSICAL THERAPY EVALUATION - INPATIENT     Room Number: 328/328-A  Evaluation Date: 11/13/2018  Type of Evaluation: Initial   Physician Order: PT Eval and Treat    Presenting Problem: acute chest pain  Reason for Therapy: Mobility Dysfunction and Dischar MEDICAL/SOCIAL HISTORY   Problem List  Principal Problem:    Acute chest pain  Active Problems:    Acute congestive heart failure, unspecified heart failure type (HCC)    Hypokalemia    Severe recurrent majr depression w/psychotc features, mood-incongruent commode, etc.): A Little   -   Moving from lying on back to sitting on the side of the bed?: A Little   How much help from another person does the patient currently need. ..   -   Moving to and from a bed to a chair (including a wheelchair)?: A Little   -

## 2018-11-14 VITALS
BODY MASS INDEX: 18.1 KG/M2 | OXYGEN SATURATION: 95 % | TEMPERATURE: 98 F | SYSTOLIC BLOOD PRESSURE: 101 MMHG | DIASTOLIC BLOOD PRESSURE: 63 MMHG | RESPIRATION RATE: 18 BRPM | WEIGHT: 102.13 LBS | HEIGHT: 63 IN | HEART RATE: 76 BPM

## 2018-11-14 PROBLEM — E46 MALNUTRITION (HCC): Status: ACTIVE | Noted: 2018-11-14

## 2018-11-14 PROCEDURE — 99239 HOSP IP/OBS DSCHRG MGMT >30: CPT | Performed by: HOSPITALIST

## 2018-11-14 RX ORDER — CIPROFLOXACIN 500 MG/1
500 TABLET, FILM COATED ORAL 2 TIMES DAILY
Qty: 14 TABLET | Refills: 0 | Status: SHIPPED | OUTPATIENT
Start: 2018-11-14 | End: 2018-11-21

## 2018-11-14 RX ORDER — HYDROCODONE BITARTRATE AND ACETAMINOPHEN 5; 325 MG/1; MG/1
1 TABLET ORAL EVERY 4 HOURS PRN
Qty: 5 TABLET | Refills: 0 | Status: SHIPPED | OUTPATIENT
Start: 2018-11-14

## 2018-11-14 RX ORDER — LORAZEPAM 0.5 MG/1
0.5 TABLET ORAL EVERY 6 HOURS PRN
Qty: 5 TABLET | Refills: 0 | Status: SHIPPED | OUTPATIENT
Start: 2018-11-14

## 2018-11-14 NOTE — DISCHARGE SUMMARY
[facility] - [facility]    Discharge Summary    Rocío Alvarez Patient Status:  Inpatient    3/16/1935 MRN [id number]   Location [facility] 3W/SW Attending Vladimir George MD   [facility] Day # 4 PCP No primary care provider on file.      Date of son-in-law she has had a downwards course since her   last year in august.  After her   last year she moved in with her son in [place].  Her son also passed away last month.   After her son passed away she has had 4 visits to the ED i 0     lisinopril 10 MG Tabs  Commonly known as:  PRINIVIL,ZESTRIL      Take 1 tablet (10 mg total) by mouth daily. Refills:  0     OLANZapine 2.5 MG Tabs  Commonly known as:  ZYPREXA      Take 1 tablet (2.5 mg total) by mouth nightly.    Refills:  0     o daily as needed (SHORTNESS OF BREATH). Refills:  0     fluticasone-salmeterol 250-50 MCG/DOSE Aepb  Commonly known as:  ADVAIR DISKUS      Inhale 1 puff into the lungs every 12 (twelve) hours.    Refills:  0     hydrALAzine HCl 25 MG Tabs  Commonly known

## 2018-11-14 NOTE — PROGRESS NOTES
Patient is a 80year old   female with history of COPD, hypertension, stroke and history of depression who presented to the hospital with chest pain and severe depression.     Consult Duration     The patient seen for over 35-minute, kallie mechanism to deal with her depression and grieving. Judgment insight are questionable. 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   Mild cognitive

## 2018-11-14 NOTE — PLAN OF CARE
Patient discharged on 11/14 at 1:00 pm to [address] [place] [address] via ambulance. Patient alert and oriented. Caregiver at bedside. Belongings gathered and sent with patient. Discharge instructions sent with patient. Prescriptions sent with patient.  Tele mon

## 2018-11-14 NOTE — CM/SW NOTE
RN informed SW that pt is medically stable to discharge today and will need ambulance transport (complete, 2LO2).  BREN spoke w/ Carri from [facility] and arranged ambulance transport to [address] [place] [address] at [address] from [address] [place] [address] stated

## 2018-11-14 NOTE — PROGRESS NOTES
[facility] [place]    Kwame  Patient Status:  Inpatient    3/16/1935 MRN [id number]   Location [facility] 3W/SW Attending Silva Lunsford MD   [facility] Day # 4 PCP No primary care provider on file. and rhythm. S1, S2 normal. No murmur, pericardial rub, S3, thrill, heave or extra cardiac sounds. Lungs: Clear without wheezes, rales, rhonchi or dullness. Normal excursions and effort. Abdomen: Soft, non-tender.  No organosplenomegally, mass or rebound, meals   Cholecalciferol (VITAMIN D) 1000 units tab 1,000 Units 1,000 Units Oral Daily   Vitamin B-12 (VITAMIN B12) tab 2,500 mcg 2,500 mcg Oral Daily   Fluticasone Furoate-Vilanterol (BREO ELLIPTA) 100-25 MCG/INH inhaler 1 puff 1 puff Inhalation Daily   hy

## 2019-02-11 NOTE — PLAN OF CARE
Problem: Patient Centered Care  Goal: Patient preferences are identified and integrated in the patient's plan of care  Interventions:  - What would you like us to know as we care for you?  My son recently passed away  - Provide timely, complete, and accurat control medications as ordered  - Initiate emergency measures for life threatening arrhythmias  - Monitor electrolytes and administer replacement therapy as ordered  Outcome: Progressing <<----- Click to add NO pertinent Past Medical History No pertinent past medical history

## (undated) NOTE — ED AVS SNAPSHOT
Kerri Archibald   MRN: [id number]    Department:  [facility] Emergency Department   Date of Visit:  10/18/2018           Disclosure     Insurance plans vary and the physician(s) referred by the ER may not be covered by your plan.  Please contact within the next three months to obtain basic health screening including reassessment of your blood pressure.     IF THERE IS ANY CHANGE OR WORSENING OF YOUR CONDITION, CALL YOUR PRIMARY CARE PHYSICIAN AT ONCE OR RETURN IMMEDIATELY TO THE EMERGENCY DEPARTMEN

## (undated) NOTE — IP AVS SNAPSHOT
[facility]            (For Outpatient Use Only) Initial Admit Date: 11/10/2018   Inpt/Obs Admit Date: Inpt: 11/10/18 / Obs: N/A   Discharge Date:    Hospital Acct:  [de-identified]   MRN: [de-identified]   CSN: [id number]        [id number]  NF Subscriber ID:  Pt Rel to Subscriber:    Hospital Account Financial Class: Medicare    November 14, 2018

## (undated) NOTE — ED AVS SNAPSHOT
Kaylee Hatchet   MRN: [id number]    Department:  [facility] Emergency Department   Date of Visit:  10/4/2018           Disclosure     Insurance plans vary and the physician(s) referred by the ER may not be covered by your plan.  Please contact within the next three months to obtain basic health screening including reassessment of your blood pressure.     IF THERE IS ANY CHANGE OR WORSENING OF YOUR CONDITION, CALL YOUR PRIMARY CARE PHYSICIAN AT ONCE OR RETURN IMMEDIATELY TO THE EMERGENCY DEPARTMEN

## (undated) NOTE — IP AVS SNAPSHOT
Patient Demographics     Address  [address] [place] [id number] Phone  [phone] [facility])  [phone] (Mobile) *Preferred* E-mail Address  [email or web address]      Emergency Contact(s)     Name Relation Home Work Mobile    Way Hunt Pool Next dose due: Tomorrow morning 11/15      Inject 0.4 mL (40 mg total) into the skin daily. Velvet Daily, MD         fluticasone-salmeterol 250-50 MCG/DOSE Aepb  Commonly known as:  ADVAIR DISKUS  Next dose due:   Tonight 11/14      Inhale 1 puff into Take 17 g by mouth daily as needed. Kathy Morfin MD         psyllium 28 % Pack  Commonly known as:  METAMUCIL SMOOTH TEXTURE  Next dose due: Tomorrow morning 11/15      Take 1 packet by mouth daily.           RISPERDAL 0.25 MG Tabs  Generic drug:  ri [id number] PARoxetine HCl (PAXIL) tab 30 mg 11/13/18 2250 Given      [id number] Pantoprazole Sodium (PROTONIX) EC tab 40 mg 11/14/18 0636 Given      [id number] Vitamin B-12 (VITAMIN B12) tab 2,500 mcg 11/14/18 0837 Given      [id number] aspirin EC tab 81 mg 1 Components    Component Value Reference Range Flag Lab   Glucose 104 70 - 99 mg/dL [facility]   Sodium 133 136 - 144 mmol/L  [facility]   Potassium 5.2 3.3 - 5.1 mmol/L [facility]   Chloride 96 95 - 110 mmol/L — [facility]   CO2 24 22 - 32 mmo Urine Culture, Routine Once [[id number]]  (Abnormal)  (Susceptibility) Collected:  11/11/18 3525    Order Status:  Completed Lab Status:  Final result Updated:  11/13/18 2901    Specimen:  Urine, clean catch      Urine Culture >100,000 CFU/ML Escherichia c is, she knew the date as well. Per son-in-law she has had a downwards course since her   last year in august.  After her   last year she moved in with her son in [place].  Her son also passed away last month.   After her son passed a PARoxetine HCl 40 MG Oral Tab Take 40 mg by mouth nightly. risperiDONE (RISPERDAL) 0.25 MG Oral Tab Take 0.25 mg by mouth nightly. simvastatin 10 MG Oral Tab Take 10 mg by mouth nightly.    omeprazole 20 MG Oral Capsule Delayed Release Take 40 mg by karla  (H) 11/10/2018    CA 9.3 11/10/2018    ALKPHO 75 10/19/2017    TP 6.6 10/19/2017    AST 18 10/19/2017    ALT 12 10/19/2017    PTT 28.7 11/10/2018    INR 1.1 11/10/2018    T4F 1.33 10/19/2017    TSH 0.752 10/19/2017    MG 2.0 11/10/2018    TROP 0.0 Electronically signed by Clari Powell MD on 11/10/2018 10:52 AM   Attribution Key    FG. 1 - Clari Powell MD on 11/10/2018 10:26 AM  FG. 2 - Clari Powell MD on 11/10/2018 10:29 AM                        Consults - MD Consult Notes      Consults s per pt, is the daughter's cardiologist.  However today the family is requesting transfer to [facility], where the pt's MDs are. I was asked to see her for chest pain and CXR suggesting CHF.   Pt believes the chest pain only lasted a couple of minutes and •  atorvastatin (LIPITOR) tab 20 mg, 20 mg, Oral, Nightly  •  Normal Saline Flush 0.9 % injection 3 mL, 3 mL, Intravenous, PRN  •  Enoxaparin Sodium (LOVENOX) 40 MG/0.4ML injection 40 mg, 40 mg, Subcutaneous, Daily  •  acetaminophen (TYLENOL) tab 650 mg, 6 Abdomen: Soft, thin, non-tender, non-distended. No hepatosplenomegaly, bruits, masses or pulsatile masses  Extremities: Without clubbing, cyanosis or edema. Peripheral pulses are 2+. Neurologic: Alert and oriented, normal affect. Skin: Warm and dry. E: [id number] Study date: Nov 11 2018 10:34AM              Room: 328A Accession: [id number] HT:(63in) WT:(101.8lb)                       BP: 114 / 68 ------------------------------------------------------------------- Indicat complications. Transthoracic echocardiography. M-mode, complete 2D, complete spectral Doppler, and color Doppler. Patient YOB: 1935. Patient is 83yr old. Gender: female. BMI: 18.1kg/m^2. BSA: 1.43m^2. Patient status:  Inpatient.   Study date: consistent with normal central venous pressure. ------------------------------------------------------------------- Measurements  Left ventricle                              Value        Reference  LV ID, ED, PLAX                             4.3   cm     3 ratio, peak                      0.8          ---------   Tricuspid valve                             Value        Reference  Tricuspid regurg peak velocity              3.2   m/sec  ---------  Tricuspid peak RV-RA gradient               40    mm Hg  ----- able to sit edge of bed with min A x 1, occasional posterior lean when sitting, tactile cues to correct, able to correct with min A x 1 to CGA. Patient is able to take steps to bedside chair with min A x 2 with bilateral HHA.  Patient probably could ambulat Lives With: Family(son's co workers?)  Drives: No  Patient Owned Equipment: None  Patient Regularly Uses: Home O2(3 liters)    Prior Level of [place]: Patient reports assistance with ADL's, has caregiver part time, and does not ambulate per patient pr Stoop/Curb Assistance: Not tested       Bed Mobility: mod A x 1    Transfers: min A x 2     Exercise/Education Provided:  Bed mobility  Body mechanics  Gait training  Transfer training    Patient End of Session: Up in chair;Needs met;Call light within reac Patient is a 80year old female admitted 11/10/2018 for acute chest pain.   In this OT evaluation patient presents with the following impairments: decreased endurance, activity tolerance, limited motivation, impaired cognition/learning retention, decreased Principal Problem:    Acute chest pain  Active Problems:    Acute congestive heart failure, unspecified heart failure type (HCC)    Hypokalemia    Severe recurrent majr depression w/psychotc features, mood-incongruent (HCC)    Persistent complex bereavemen AM-PAC ‘6-Clicks’ Inpatient Daily Activity Short Form  How much help from another person does the patient currently need…  -   Putting on and taking off regular lower body clothing?: A Lot  -   Bathing (including washing, rinsing, drying)?: A Lot  -   Toil Video Swallow Study Notes     No notes of this type exist for this encounter.          SLP Note - SLP Notes      SLP Note signed by VILLA Sultana at 11/14/2018 10:12 AM  Version 1 of 1    Author:  VILLA Sultana Service:  Irene Salgado Discharge Recommendations/Plan: Skilled nursing facility;Sub-acute rehabilitation    Treatment Plan  Treatment Plan/Recommendations: Dysphagia therapy; Aspiration precautions    Interdisciplinary Communication: Discussed with RN      GOALS  Goal #1 The saul Electronically signed by VILLA Brewer on 11/14/2018 10:12 AM   Attribution [place]    BP. 1 - VILLA Brewer on 11/14/2018 10:04 AM

## (undated) NOTE — ED AVS SNAPSHOT
Stephen Arnold   MRN: [id number]    Department:  [facility] Emergency Department   Date of Visit:  7/24/2018           Disclosure     Insurance plans vary and the physician(s) referred by the ER may not be covered by your plan.  Please contact within the next three months to obtain basic health screening including reassessment of your blood pressure.     IF THERE IS ANY CHANGE OR WORSENING OF YOUR CONDITION, CALL YOUR PRIMARY CARE PHYSICIAN AT ONCE OR RETURN IMMEDIATELY TO THE EMERGENCY DEPARTMEN

## (undated) NOTE — ED AVS SNAPSHOT
Mayo Cano   MRN: [id number]    Department:  [facility] Emergency Department   Date of Visit:  10/10/2018           Disclosure     Insurance plans vary and the physician(s) referred by the ER may not be covered by your plan.  Please contact within the next three months to obtain basic health screening including reassessment of your blood pressure.     IF THERE IS ANY CHANGE OR WORSENING OF YOUR CONDITION, CALL YOUR PRIMARY CARE PHYSICIAN AT ONCE OR RETURN IMMEDIATELY TO THE EMERGENCY DEPARTMEN

## (undated) NOTE — ED AVS SNAPSHOT
Edward Patel   MRN: [id number]    Department:  [facility] Emergency Department   Date of Visit:  10/5/2018           Disclosure     Insurance plans vary and the physician(s) referred by the ER may not be covered by your plan.  Please contact within the next three months to obtain basic health screening including reassessment of your blood pressure.     IF THERE IS ANY CHANGE OR WORSENING OF YOUR CONDITION, CALL YOUR PRIMARY CARE PHYSICIAN AT ONCE OR RETURN IMMEDIATELY TO THE EMERGENCY DEPARTMEN